# Patient Record
Sex: MALE | Race: WHITE | Employment: OTHER | URBAN - NONMETROPOLITAN AREA
[De-identification: names, ages, dates, MRNs, and addresses within clinical notes are randomized per-mention and may not be internally consistent; named-entity substitution may affect disease eponyms.]

---

## 2018-02-19 ENCOUNTER — INITIAL CONSULT (OUTPATIENT)
Dept: SURGERY | Age: 68
End: 2018-02-19
Payer: MEDICARE

## 2018-02-19 VITALS
HEIGHT: 70 IN | WEIGHT: 315 LBS | BODY MASS INDEX: 45.1 KG/M2 | SYSTOLIC BLOOD PRESSURE: 130 MMHG | TEMPERATURE: 96.7 F | HEART RATE: 100 BPM | DIASTOLIC BLOOD PRESSURE: 74 MMHG

## 2018-02-19 DIAGNOSIS — K57.30 SIGMOID DIVERTICULOSIS: ICD-10-CM

## 2018-02-19 DIAGNOSIS — E66.9 OBESITY, UNSPECIFIED CLASSIFICATION, UNSPECIFIED OBESITY TYPE, UNSPECIFIED WHETHER SERIOUS COMORBIDITY PRESENT: ICD-10-CM

## 2018-02-19 DIAGNOSIS — R19.4 ENCOUNTER FOR DIAGNOSTIC COLONOSCOPY DUE TO CHANGE IN BOWEL HABITS: ICD-10-CM

## 2018-02-19 DIAGNOSIS — K62.5 RECTAL BLEEDING: Primary | ICD-10-CM

## 2018-02-19 PROCEDURE — 1036F TOBACCO NON-USER: CPT | Performed by: SURGERY

## 2018-02-19 PROCEDURE — 99204 OFFICE O/P NEW MOD 45 MIN: CPT | Performed by: SURGERY

## 2018-02-19 PROCEDURE — 3017F COLORECTAL CA SCREEN DOC REV: CPT | Performed by: SURGERY

## 2018-02-19 PROCEDURE — 1123F ACP DISCUSS/DSCN MKR DOCD: CPT | Performed by: SURGERY

## 2018-02-19 PROCEDURE — G8417 CALC BMI ABV UP PARAM F/U: HCPCS | Performed by: SURGERY

## 2018-02-19 PROCEDURE — 4040F PNEUMOC VAC/ADMIN/RCVD: CPT | Performed by: SURGERY

## 2018-02-19 PROCEDURE — G8427 DOCREV CUR MEDS BY ELIG CLIN: HCPCS | Performed by: SURGERY

## 2018-02-19 PROCEDURE — G8484 FLU IMMUNIZE NO ADMIN: HCPCS | Performed by: SURGERY

## 2018-02-19 RX ORDER — SERTRALINE HYDROCHLORIDE 100 MG/1
100 TABLET, FILM COATED ORAL
COMMUNITY
Start: 2017-05-24

## 2018-02-19 RX ORDER — COVID-19 ANTIGEN TEST
1 KIT MISCELLANEOUS
COMMUNITY
End: 2022-10-03

## 2018-02-19 RX ORDER — ALBUTEROL SULFATE 90 UG/1
2 AEROSOL, METERED RESPIRATORY (INHALATION)
COMMUNITY
Start: 2018-02-08 | End: 2022-10-04

## 2018-02-19 NOTE — LETTER
PROCEDURE DATE:                                  Estimated surgery arrival time     You will be notified the day before surgery (usually in the afternoon) of your arrival time by the surgery center.

## 2018-03-26 ENCOUNTER — OFFICE VISIT (OUTPATIENT)
Dept: SURGERY | Age: 68
End: 2018-03-26
Payer: MEDICARE

## 2018-03-26 VITALS
SYSTOLIC BLOOD PRESSURE: 162 MMHG | DIASTOLIC BLOOD PRESSURE: 90 MMHG | BODY MASS INDEX: 45.1 KG/M2 | HEIGHT: 70 IN | HEART RATE: 88 BPM | TEMPERATURE: 96.2 F | WEIGHT: 315 LBS

## 2018-03-26 DIAGNOSIS — K51.40 PSEUDOPOLYP OF TRANSVERSE COLON WITHOUT COMPLICATION (HCC): Primary | ICD-10-CM

## 2018-03-26 DIAGNOSIS — K64.8 INTERNAL HEMORRHOIDS: ICD-10-CM

## 2018-03-26 PROCEDURE — G8417 CALC BMI ABV UP PARAM F/U: HCPCS | Performed by: SURGERY

## 2018-03-26 PROCEDURE — 3017F COLORECTAL CA SCREEN DOC REV: CPT | Performed by: SURGERY

## 2018-03-26 PROCEDURE — 1036F TOBACCO NON-USER: CPT | Performed by: SURGERY

## 2018-03-26 PROCEDURE — G8427 DOCREV CUR MEDS BY ELIG CLIN: HCPCS | Performed by: SURGERY

## 2018-03-26 PROCEDURE — 4040F PNEUMOC VAC/ADMIN/RCVD: CPT | Performed by: SURGERY

## 2018-03-26 PROCEDURE — 1123F ACP DISCUSS/DSCN MKR DOCD: CPT | Performed by: SURGERY

## 2018-03-26 PROCEDURE — 99213 OFFICE O/P EST LOW 20 MIN: CPT | Performed by: SURGERY

## 2018-03-26 PROCEDURE — G8484 FLU IMMUNIZE NO ADMIN: HCPCS | Performed by: SURGERY

## 2022-09-22 PROBLEM — U07.1 PNEUMONIA DUE TO COVID-19 VIRUS: Status: ACTIVE | Noted: 2020-11-20

## 2022-09-22 PROBLEM — U07.1 ACUTE HYPOXEMIC RESPIRATORY FAILURE DUE TO COVID-19 (HCC): Status: ACTIVE | Noted: 2020-11-19

## 2022-09-22 PROBLEM — J96.01 ACUTE HYPOXEMIC RESPIRATORY FAILURE DUE TO COVID-19 (HCC): Status: ACTIVE | Noted: 2020-11-19

## 2022-09-22 PROBLEM — U07.1 CLINICAL DIAGNOSIS OF COVID-19: Status: ACTIVE | Noted: 2022-09-22

## 2022-09-22 PROBLEM — G47.30 SLEEP APNEA: Status: ACTIVE | Noted: 2022-09-22

## 2022-09-22 PROBLEM — I26.99 ACUTE PULMONARY EMBOLISM (HCC): Status: ACTIVE | Noted: 2020-12-07

## 2022-09-22 PROBLEM — I82.439 DVT OF POPLITEAL VEIN (HCC): Status: ACTIVE | Noted: 2022-04-24

## 2022-09-22 PROBLEM — E66.01 MORBID OBESITY (HCC): Status: ACTIVE | Noted: 2022-09-22

## 2022-09-22 PROBLEM — J12.82 PNEUMONIA DUE TO COVID-19 VIRUS: Status: ACTIVE | Noted: 2020-11-20

## 2022-09-22 RX ORDER — POTASSIUM CHLORIDE 20 MEQ/1
20 TABLET, EXTENDED RELEASE ORAL DAILY
COMMUNITY
Start: 2022-04-22 | End: 2023-04-22

## 2022-09-22 RX ORDER — CARVEDILOL 3.12 MG/1
3.12 TABLET ORAL 2 TIMES DAILY WITH MEALS
COMMUNITY

## 2022-09-22 RX ORDER — FAMOTIDINE 20 MG/1
20 TABLET, FILM COATED ORAL NIGHTLY
COMMUNITY
Start: 2022-04-22 | End: 2023-04-22

## 2022-09-22 RX ORDER — FLUTICASONE PROPIONATE AND SALMETEROL 250; 50 UG/1; UG/1
POWDER RESPIRATORY (INHALATION)
COMMUNITY
End: 2022-10-03

## 2022-09-22 RX ORDER — FLUTICASONE FUROATE 100 UG/1
POWDER RESPIRATORY (INHALATION)
COMMUNITY
Start: 2022-06-15

## 2022-09-22 RX ORDER — OMEGA-3 FATTY ACIDS/FISH OIL 300-1000MG
400 CAPSULE ORAL DAILY PRN
COMMUNITY

## 2022-09-22 RX ORDER — FUROSEMIDE 20 MG/1
20 TABLET ORAL DAILY
COMMUNITY
Start: 2022-04-22 | End: 2023-04-22

## 2022-09-22 RX ORDER — CYANOCOBALAMIN 1000 UG/ML
1000 INJECTION INTRAMUSCULAR; SUBCUTANEOUS
COMMUNITY
Start: 2022-05-25 | End: 2022-10-03

## 2022-09-22 RX ORDER — ALBUTEROL SULFATE 2.5 MG/3ML
2.5 SOLUTION RESPIRATORY (INHALATION) EVERY 6 HOURS PRN
COMMUNITY

## 2022-09-22 RX ORDER — FLUTICASONE FUROATE AND VILANTEROL 100; 25 UG/1; UG/1
1 POWDER RESPIRATORY (INHALATION) DAILY
COMMUNITY
Start: 2020-12-12 | End: 2022-10-03

## 2022-09-22 RX ORDER — OMEPRAZOLE 40 MG/1
40 CAPSULE, DELAYED RELEASE ORAL DAILY
COMMUNITY
Start: 2022-09-20 | End: 2022-10-04

## 2022-09-22 RX ORDER — MECLIZINE HCL 12.5 MG/1
12.5 TABLET ORAL DAILY PRN
COMMUNITY
Start: 2022-04-22 | End: 2023-04-22

## 2022-10-03 ENCOUNTER — INITIAL CONSULT (OUTPATIENT)
Dept: SURGERY | Age: 72
End: 2022-10-03
Payer: MEDICARE

## 2022-10-03 VITALS
DIASTOLIC BLOOD PRESSURE: 76 MMHG | WEIGHT: 293.8 LBS | SYSTOLIC BLOOD PRESSURE: 132 MMHG | OXYGEN SATURATION: 95 % | HEART RATE: 72 BPM | HEIGHT: 69 IN | TEMPERATURE: 97.9 F | BODY MASS INDEX: 43.52 KG/M2

## 2022-10-03 DIAGNOSIS — R79.89 ABNORMAL LIVER FUNCTION TESTS: ICD-10-CM

## 2022-10-03 DIAGNOSIS — G47.33 OBSTRUCTIVE SLEEP APNEA SYNDROME: ICD-10-CM

## 2022-10-03 DIAGNOSIS — R79.89 ABNORMAL LIVER FUNCTION TESTS: Primary | ICD-10-CM

## 2022-10-03 DIAGNOSIS — J44.9 CHRONIC OBSTRUCTIVE PULMONARY DISEASE, UNSPECIFIED COPD TYPE (HCC): ICD-10-CM

## 2022-10-03 DIAGNOSIS — E66.01 MORBID OBESITY (HCC): ICD-10-CM

## 2022-10-03 DIAGNOSIS — K80.20 CALCULUS OF GALLBLADDER WITHOUT CHOLECYSTITIS WITHOUT OBSTRUCTION: ICD-10-CM

## 2022-10-03 PROCEDURE — G8417 CALC BMI ABV UP PARAM F/U: HCPCS | Performed by: SURGERY

## 2022-10-03 PROCEDURE — G8427 DOCREV CUR MEDS BY ELIG CLIN: HCPCS | Performed by: SURGERY

## 2022-10-03 PROCEDURE — 1036F TOBACCO NON-USER: CPT | Performed by: SURGERY

## 2022-10-03 PROCEDURE — G8484 FLU IMMUNIZE NO ADMIN: HCPCS | Performed by: SURGERY

## 2022-10-03 PROCEDURE — 99204 OFFICE O/P NEW MOD 45 MIN: CPT | Performed by: SURGERY

## 2022-10-03 PROCEDURE — 3023F SPIROM DOC REV: CPT | Performed by: SURGERY

## 2022-10-03 PROCEDURE — 1123F ACP DISCUSS/DSCN MKR DOCD: CPT | Performed by: SURGERY

## 2022-10-03 PROCEDURE — 3017F COLORECTAL CA SCREEN DOC REV: CPT | Performed by: SURGERY

## 2022-10-03 ASSESSMENT — ENCOUNTER SYMPTOMS
VOICE CHANGE: 0
VOMITING: 0
SHORTNESS OF BREATH: 1
NAUSEA: 0
WHEEZING: 1
SORE THROAT: 0
COUGH: 0
DIARRHEA: 0
CONSTIPATION: 0
TROUBLE SWALLOWING: 0
BACK PAIN: 1
ABDOMINAL PAIN: 1
FLATUS: 1
CHEST TIGHTNESS: 0
BELCHING: 1
ABDOMINAL DISTENTION: 0

## 2022-10-03 ASSESSMENT — CROHNS DISEASE ACTIVITY INDEX (CDAI): CDAI SCORE: 0

## 2022-10-03 NOTE — PROGRESS NOTES
Abdominal Pain  This is a new problem. The current episode started 1 to 4 weeks ago (About 3 weeks). The problem occurs every several days (About 4 - 5 times). The most recent episode lasted 4 hours. The pain is located in the RUQ and epigastric region. The quality of the pain is a sensation of fullness and aching. The abdominal pain does not radiate. Associated symptoms include arthralgias, belching, flatus and headaches. Pertinent negatives include no anorexia, constipation, diarrhea, dysuria, fever, frequency, hematuria, myalgias, nausea or vomiting. Nothing aggravates the pain. The pain is relieved by Nothing. He has tried H2 blockers, proton pump inhibitors and antacids for the symptoms. The treatment provided significant relief. Prior diagnostic workup includes ultrasound. His past medical history is significant for abdominal surgery (appendectomy) and gallstones. There is no history of colon cancer, Crohn's disease, GERD, irritable bowel syndrome, pancreatitis, PUD or ulcerative colitis.      Past Medical History:   Diagnosis Date    Asthma     Depression     Emphysema of lung ([address]     Sleep apnea      Past Surgical History:   Procedure Laterality Date    ABDOMINAL WALL SURGERY Right 2000    RLQ lipoma removed    APPENDECTOMY N/A 1957    CARPAL TUNNEL RELEASE Right     COLONOSCOPY  03/19/2018    polyp-[id number]    EXTRACORPOREAL SHOCK WAVE LITHOTRIPSY      KNEE ARTHROSCOPY Right     ROTATOR CUFF REPAIR Right     SHOULDER SURGERY Right     TONSILLECTOMY       Current Outpatient Medications   Medication Sig Dispense Refill    albuterol (PROVENTIL) (2.5 MG/3ML) 0.083% nebulizer solution Take 2.5 mg by nebulization every 6 hours as needed for Wheezing      carvedilol (COREG) 3.125 MG tablet Take 3.125 mg by mouth 2 times daily (with meals)      Cyanocobalamin 1000 MCG CAPS Take 1,000 mcg by mouth daily      famotidine (PEPCID) 20 MG tablet Take 20 mg by mouth at bedtime      furosemide (LASIX) 20 MG vomiting. Endocrine: Positive for polydipsia and polyuria. Negative for polyphagia. Genitourinary:  Negative for dysuria, frequency and hematuria. Musculoskeletal:  Positive for arthralgias, back pain and neck pain. Negative for myalgias. Skin:  Positive for rash (left leg). Negative for wound. Neurological:  Positive for dizziness (vertigo for a long time), weakness, numbness (left lateral thigh) and headaches. Negative for seizures, syncope, speech difficulty and light-headedness. Hematological:  Bruises/bleeds easily. Psychiatric/Behavioral:  Positive for sleep disturbance (excessive sleepiness). Negative for behavioral problems, confusion, decreased concentration and dysphoric mood. The patient is not hyperactive. /76 (Site: Left Upper Arm, Position: Sitting, Cuff Size: Large Adult)   Pulse 72   Temp 97.9 °F (36.6 °C) (Temporal)   Ht 5' 9\" (1.753 m)   Wt 293 lb 12.8 oz (133.3 kg)   SpO2 95%   BMI 43.39 kg/m²     Physical Exam  Constitutional:       General: He is not in acute distress. Appearance: He is morbidly obese. He is ill-appearing. Comments: Chronically ill and frail appearing   HENT:      Head: Normocephalic and atraumatic. Eyes:      General: No scleral icterus. Right eye: No discharge. Left eye: No discharge. Extraocular Movements: Extraocular movements intact. Conjunctiva/sclera: Conjunctivae normal.      Pupils: Pupils are equal, round, and reactive to light. Neck:      Vascular: No carotid bruit. Cardiovascular:      Rate and Rhythm: Normal rate and regular rhythm. Pulmonary:      Effort: Respiratory distress present. Breath sounds: Decreased air movement present. Decreased breath sounds present. Abdominal:      General: Abdomen is protuberant. There is no distension or abdominal bruit. Palpations: Abdomen is soft. There is no hepatomegaly or splenomegaly. Tenderness: There is no abdominal tenderness.  There is no guarding or rebound. Negative signs include Yañez's sign. Hernia: No hernia is present. There is no hernia in the umbilical area, ventral area, left inguinal area or right inguinal area. Musculoskeletal:      Cervical back: Normal range of motion and neck supple. Tenderness present. No rigidity. Comments: Walks with cane   Lymphadenopathy:      Cervical: No cervical adenopathy. Skin:     General: Skin is warm and dry. Neurological:      General: No focal deficit present. Mental Status: He is alert and oriented to person, place, and time. Psychiatric:         Mood and Affect: Mood normal.         Behavior: Behavior normal. Behavior is cooperative. Thought Content: Thought content normal.         Judgment: Judgment normal.      Ultrasound shows gallstones    Abnormal LFTs    IMP/PLN  1) Cholelithiasis - possible biliary colic, chronic cholecystitis  - He has fairly typical symptoms.  - Responded to antacids which points to a potential acid problem such as GERD, ulcers gastritis. - I think he should consider having his gall bladder out, but his risk of surgery is considerably elevated. - The is an increased chance he might need prolonged ventilation   - I would recommend he do this in [place] or [facility] where he can be placed on the ventilator if needed. - He has not had symptoms in over a week.   I recommend continuing of a low fat diet and have a pulmonology evaluation  2) COPD, chronic respiratory distress - Pulmonology referral.   3) Repeat LFTs  4) Morbid Obesity

## 2022-10-04 ENCOUNTER — OFFICE VISIT (OUTPATIENT)
Dept: OTOLARYNGOLOGY | Age: 72
End: 2022-10-04
Payer: MEDICARE

## 2022-10-04 ENCOUNTER — HOSPITAL ENCOUNTER (OUTPATIENT)
Dept: LAB | Age: 72
Discharge: HOME | End: 2022-10-04
Payer: MEDICARE

## 2022-10-04 VITALS
HEIGHT: 69 IN | SYSTOLIC BLOOD PRESSURE: 136 MMHG | WEIGHT: 291 LBS | OXYGEN SATURATION: 98 % | DIASTOLIC BLOOD PRESSURE: 78 MMHG | RESPIRATION RATE: 16 BRPM | BODY MASS INDEX: 43.1 KG/M2 | HEART RATE: 72 BPM

## 2022-10-04 DIAGNOSIS — H91.20 SUDDEN-ONSET SENSORINEURAL HEARING LOSS: Primary | ICD-10-CM

## 2022-10-04 DIAGNOSIS — H91.20 SUDDEN-ONSET SENSORINEURAL HEARING LOSS: ICD-10-CM

## 2022-10-04 LAB
CREAT SERPL-MCNC: 0.85 MG/DL (ref 0.7–1.2)
GFR SERPL CREATININE-BSD FRML MDRD: >60 ML/MIN/1.73M2

## 2022-10-04 PROCEDURE — 82565 ASSAY OF CREATININE: CPT

## 2022-10-04 PROCEDURE — 99215 OFFICE O/P EST HI 40 MIN: CPT | Performed by: OTOLARYNGOLOGY

## 2022-10-04 PROCEDURE — 3017F COLORECTAL CA SCREEN DOC REV: CPT | Performed by: OTOLARYNGOLOGY

## 2022-10-04 PROCEDURE — G8417 CALC BMI ABV UP PARAM F/U: HCPCS | Performed by: OTOLARYNGOLOGY

## 2022-10-04 PROCEDURE — 1123F ACP DISCUSS/DSCN MKR DOCD: CPT | Performed by: OTOLARYNGOLOGY

## 2022-10-04 PROCEDURE — 1036F TOBACCO NON-USER: CPT | Performed by: OTOLARYNGOLOGY

## 2022-10-04 PROCEDURE — G8427 DOCREV CUR MEDS BY ELIG CLIN: HCPCS | Performed by: OTOLARYNGOLOGY

## 2022-10-04 PROCEDURE — G8484 FLU IMMUNIZE NO ADMIN: HCPCS | Performed by: OTOLARYNGOLOGY

## 2022-10-04 PROCEDURE — 99203 OFFICE O/P NEW LOW 30 MIN: CPT | Performed by: OTOLARYNGOLOGY

## 2022-10-04 PROCEDURE — 36415 COLL VENOUS BLD VENIPUNCTURE: CPT

## 2022-10-04 RX ORDER — CHOLECALCIFEROL (VITAMIN D3) 1250 MCG
CAPSULE ORAL DAILY
COMMUNITY

## 2022-10-04 RX ORDER — PREDNISONE 20 MG/1
TABLET ORAL
Qty: 28 TABLET | Refills: 0 | Status: SHIPPED | OUTPATIENT
Start: 2022-10-04

## 2022-10-04 RX ORDER — MULTIVIT WITH MINERALS/LUTEIN
250 TABLET ORAL DAILY
COMMUNITY

## 2022-10-04 NOTE — PROGRESS NOTES
10/4/2022 1:34 PM EDT  Kendra Bhakta (:  1950) is a 74 y.o. male,New patient, here for evaluation of the following chief complaint(s):  Hearing Problem (About 2 weeks on left side as not been able to hear much of anything. Says it sounds like a \"swooshing\" sound all the time. )      ASSESSMENT/PLAN:  1. Sudden-onset sensorineural hearing loss      1. Sudden-onset sensorineural hearing loss  Comments:  left  Orders:  -     MRI IAC POSTERIOR FOSSA W WO CONTRAST; Future  -     Creatinine; Future  Left sudden sensorineural hearing loss  High-dose prednisone  MRI IAC to evaluate for retrocochlear pathology  Repeat audiogram in 4 to 6 weeks  Follow-up to review audiogram    No follow-ups on file. SUBJECTIVE/OBJECTIVE:  HPI  States that 2 to 3 weeks ago, he was mowing the grass and then noticed decreased hearing on the left side. He was in to see the audiologist today who noted a significant change in his left hearing thresholds since his audiogram in . The reason for the audiogram was ear pain and bilateral tinnitus. Patient denies any recent changes in tinnitus, vertigo, otalgia. He has a history of vertigo or 15 years ago that was treated as BPPV in [facility]. No preceding upper respiratory infections. He did have COVID in 2020 when he was in the hospital and intubated for about 45 days and then in a rehab facility for another 45 days. He has some chronic conditions attributed to COVID.     Audiogram 2007  Right hearing hearing within normal limits through 1000 Hz with mild to moderate high-frequency sensorineural hearing loss  Left hearing within normal limits through 2000 Hz with moderate to severe high-frequency sensorineural hearing loss  Asymmetric bone lines at 3000, 6000, 8000 Hz  Word recognition score 100% on the right, 96% on the left  Type A tympanometry AU    Audiogram 10/4/2022  Right hearing borderline through 2000 Hz with moderate high-frequency sensorineural hearing loss  Left hearing moderate to severe sensorineural hearing loss  Greater than 30 DB asymmetry in 3 consecutive frequencies  Word recognition score 100% on the right, 54% on the left  Type A tympanometry AU    Past Medical History:   Diagnosis Date    Asthma     Depression     Emphysema of lung ([address]     Hypertension     Sleep apnea      Past Surgical History:   Procedure Laterality Date    ABDOMINAL WALL SURGERY Right 2000    RLQ lipoma removed    APPENDECTOMY N/A 1957    CARPAL TUNNEL RELEASE Right     COLONOSCOPY  03/19/2018    polyp-hemorrhoid-aljadda-pch    EXTRACORPOREAL SHOCK WAVE LITHOTRIPSY      KNEE ARTHROSCOPY Right     ROTATOR CUFF REPAIR Right     SHOULDER SURGERY Right     TONSILLECTOMY       Social History       Tobacco History       Smoking Status  Former Quit Date  2/19/1985 Smoking Tobacco Type  Cigarettes quit in 2/19/1985      Smokeless Tobacco Use  Never              Alcohol History       Alcohol Use Status  Yes Comment  beer occasioanly 1-2 per month              Drug Use       Drug Use Status  No              Sexual Activity       Sexually Active  Not Asked                  History reviewed. No pertinent family history.   Current Outpatient Medications   Medication Instructions    albuterol (PROVENTIL) 2.5 mg, Nebulization, EVERY 6 HOURS PRN    albuterol sulfate  (90 Base) MCG/ACT inhaler 2 puffs, Inhalation    aspirin 81 mg, Oral    B Complex Vitamins (B-COMPLEX/B-12 PO) Oral, DAILY    carvedilol (COREG) 3.125 mg, Oral, 2 TIMES DAILY WITH MEALS    Cholecalciferol (VITAMIN D3) 1.25 MG (23559 UT) CAPS Oral, DAILY    Cyanocobalamin 1,000 mcg, Oral, DAILY    famotidine (PEPCID) 20 mg, Oral, Nightly    fluticasone (ARNUITY ELLIPTA) 100 MCG/ACT AEPB inhale 1 puff by inhalation route  every day at the same time each day    fluticasone-salmeterol (ADVAIR) 100-50 MCG/DOSE diskus inhaler 1 puff, Inhalation    furosemide (LASIX) 20 mg, DAILY    ibuprofen (ADVIL;MOTRIN) 400 mg, Oral, DAILY PRN meclizine (ANTIVERT) 12.5 mg, Oral, DAILY PRN    Nebulizer MISC 1 ampule    omeprazole (PRILOSEC) 40 mg, Oral, DAILY    OXYGEN Other, NIGHTLY PRN    potassium chloride (KLOR-CON M) 20 MEQ extended release tablet 20 mEq, Oral, DAILY    predniSONE (DELTASONE) 20 MG tablet Take 3 tabs (60mg) once a day for 7 days. Then take 2 tabs (40mg) once a day for 2 days, 1 tab (20mg) once a day for 2 days, then 1/2 tab (10mg) once a day for 2 days    sertraline (ZOLOFT) 100 mg, Oral    vitamin C 250 mg, Oral, DAILY     Allergies   Allergen Reactions    Moxifloxacin Shortness Of Breath       ENT ROS: positive for - hearing change    General: The patient is found to be alert and normally responsive male. Hearing: grossly normal   Voice: Clear   Skin: The skin has normal colour and turgor. Face: The facial contour is symmetric at rest and with movement. Ears: The pinnae have normal contours. AD: EAC clear, TM intact, no effusion/erythema/retraction   AS: EAC clear, TM intact, no effusion/erythema/retraction   Eye: The ocular movements are full and symmetric, the conjunctiva is unremarkable; sclera are anicteric, pupillary response is symmetric. No nystagmus is found. Nose:   The external nasal contour is normal  The nasal mucosa has a normal appearance. The nasal septum is straight. The turbinates have a normal appearance  The nares are patent without evidence of polyposis   Oral cavity:   The dentition is healthy. The oral mucosa is without lesions;  the tongue is symmetric with full mobility and is without fasciculation. The soft palate is symmetric. The oropharynx is unremarkable. Neck: The neck has a normal contour; no masses are found on palpation        An electronic signature was used to authenticate this note.     --Omar Nash MD     10/4/2022 1:34 PM EDT

## 2022-10-07 ENCOUNTER — TELEPHONE (OUTPATIENT)
Dept: SURGERY | Age: 72
End: 2022-10-07

## 2022-10-07 DIAGNOSIS — J44.9 CHRONIC OBSTRUCTIVE PULMONARY DISEASE, UNSPECIFIED COPD TYPE (HCC): Primary | ICD-10-CM

## 2022-10-07 NOTE — TELEPHONE ENCOUNTER
Patients initial consult was this past Monday 10/3/22 @ [address]. A referral to pulmonology was discussed to see if patient was fit for surgery. Dr. Chris Aguilar and patient wanted to discuss this with PCP office for any recommendations. Writer contacted Dr. Lubna Bailon office and his nurse stated to go ahead and place referral for pulmonologist here at [facility]. Pulmonology referral placed, reached out to schedule appointment and nurse answering states someone would call patient to schedule on the following Monday. LM for patient to return call to office.

## 2022-10-11 ENCOUNTER — HOSPITAL ENCOUNTER (OUTPATIENT)
Dept: MRI IMAGING | Age: 72
Discharge: HOME | End: 2022-10-13
Payer: MEDICARE

## 2022-10-11 DIAGNOSIS — H91.20 SUDDEN-ONSET SENSORINEURAL HEARING LOSS: ICD-10-CM

## 2022-10-11 PROCEDURE — 6360000004 HC RX CONTRAST MEDICATION: Performed by: OTOLARYNGOLOGY

## 2022-10-11 PROCEDURE — A9579 GAD-BASE MR CONTRAST NOS,1ML: HCPCS | Performed by: OTOLARYNGOLOGY

## 2022-10-11 PROCEDURE — 70553 MRI BRAIN STEM W/O & W/DYE: CPT

## 2022-10-11 RX ADMIN — GADOTERIDOL 15 ML: 279.3 INJECTION, SOLUTION INTRAVENOUS at 14:25

## 2022-10-28 ENCOUNTER — APPOINTMENT (OUTPATIENT)
Dept: CT IMAGING | Age: 72
End: 2022-10-28
Payer: MEDICARE

## 2022-10-28 ENCOUNTER — HOSPITAL ENCOUNTER (EMERGENCY)
Age: 72
Discharge: ANOTHER HOSPITAL | End: 2022-10-28
Attending: EMERGENCY MEDICINE
Payer: MEDICARE

## 2022-10-28 VITALS
OXYGEN SATURATION: 96 % | SYSTOLIC BLOOD PRESSURE: 105 MMHG | DIASTOLIC BLOOD PRESSURE: 66 MMHG | HEIGHT: 69 IN | BODY MASS INDEX: 42.95 KG/M2 | HEART RATE: 92 BPM | TEMPERATURE: 100.5 F | WEIGHT: 290 LBS | RESPIRATION RATE: 20 BRPM

## 2022-10-28 DIAGNOSIS — K81.9 CHOLECYSTITIS: Primary | ICD-10-CM

## 2022-10-28 LAB
ABSOLUTE EOS #: 0.09 K/UL (ref 0–0.44)
ABSOLUTE IMMATURE GRANULOCYTE: 0.11 K/UL (ref 0–0.3)
ABSOLUTE LYMPH #: 0.67 K/UL (ref 1.1–3.7)
ABSOLUTE MONO #: 1.45 K/UL (ref 0.1–1.2)
ALBUMIN SERPL-MCNC: 3.9 G/DL (ref 3.5–5.2)
ALBUMIN/GLOBULIN RATIO: 1.4 (ref 1–2.5)
ALP BLD-CCNC: 84 U/L (ref 40–129)
ALT SERPL-CCNC: 34 U/L (ref 5–41)
AMYLASE: 24 U/L (ref 28–100)
ANION GAP SERPL CALCULATED.3IONS-SCNC: 13 MMOL/L (ref 9–17)
AST SERPL-CCNC: 29 U/L
BASOPHILS # BLD: 0 % (ref 0–2)
BASOPHILS ABSOLUTE: 0.06 K/UL (ref 0–0.2)
BILIRUB SERPL-MCNC: 2.9 MG/DL (ref 0.3–1.2)
BILIRUBIN DIRECT: 1 MG/DL
BILIRUBIN, INDIRECT: 1.9 MG/DL (ref 0–1)
BUN BLDV-MCNC: 15 MG/DL (ref 8–23)
CALCIUM SERPL-MCNC: 9 MG/DL (ref 8.6–10.4)
CHLORIDE BLD-SCNC: 101 MMOL/L (ref 98–107)
CO2: 22 MMOL/L (ref 20–31)
CREAT SERPL-MCNC: 0.87 MG/DL (ref 0.7–1.2)
EOSINOPHILS RELATIVE PERCENT: 0 % (ref 1–4)
GFR SERPL CREATININE-BSD FRML MDRD: >60 ML/MIN/1.73M2
GLUCOSE BLD-MCNC: 121 MG/DL (ref 70–99)
HCT VFR BLD CALC: 43.6 % (ref 40.7–50.3)
HEMOGLOBIN: 14.8 G/DL (ref 13–17)
IMMATURE GRANULOCYTES: 1 %
LACTIC ACID, SEPSIS: 0.9 MMOL/L (ref 0.5–1.9)
LACTIC ACID: 1.8 MMOL/L (ref 0.5–2.2)
LIPASE: 12 U/L (ref 13–60)
LYMPHOCYTES # BLD: 3 % (ref 24–43)
MCH RBC QN AUTO: 29.7 PG (ref 25.2–33.5)
MCHC RBC AUTO-ENTMCNC: 33.9 G/DL (ref 25.2–33.5)
MCV RBC AUTO: 87.6 FL (ref 82.6–102.9)
MONOCYTES # BLD: 7 % (ref 3–12)
NRBC AUTOMATED: 0 PER 100 WBC
PDW BLD-RTO: 13.5 % (ref 11.8–14.4)
PLATELET # BLD: 185 K/UL (ref 138–453)
PMV BLD AUTO: 11.6 FL (ref 8.1–13.5)
POTASSIUM SERPL-SCNC: 3.7 MMOL/L (ref 3.7–5.3)
RBC # BLD: 4.98 M/UL (ref 4.21–5.77)
SARS-COV-2, RAPID: NOT DETECTED
SEG NEUTROPHILS: 89 % (ref 36–65)
SEGMENTED NEUTROPHILS ABSOLUTE COUNT: 18.19 K/UL (ref 1.5–8.1)
SODIUM BLD-SCNC: 136 MMOL/L (ref 135–144)
SPECIMEN DESCRIPTION: NORMAL
TOTAL PROTEIN: 6.6 G/DL (ref 6.4–8.3)
TROPONIN, HIGH SENSITIVITY: 16 NG/L (ref 0–22)
WBC # BLD: 20.6 K/UL (ref 3.5–11.3)

## 2022-10-28 PROCEDURE — 87040 BLOOD CULTURE FOR BACTERIA: CPT

## 2022-10-28 PROCEDURE — 96376 TX/PRO/DX INJ SAME DRUG ADON: CPT

## 2022-10-28 PROCEDURE — 84484 ASSAY OF TROPONIN QUANT: CPT

## 2022-10-28 PROCEDURE — 96365 THER/PROPH/DIAG IV INF INIT: CPT

## 2022-10-28 PROCEDURE — 6360000004 HC RX CONTRAST MEDICATION: Performed by: EMERGENCY MEDICINE

## 2022-10-28 PROCEDURE — 36415 COLL VENOUS BLD VENIPUNCTURE: CPT

## 2022-10-28 PROCEDURE — 87635 SARS-COV-2 COVID-19 AMP PRB: CPT

## 2022-10-28 PROCEDURE — 80053 COMPREHEN METABOLIC PANEL: CPT

## 2022-10-28 PROCEDURE — 83605 ASSAY OF LACTIC ACID: CPT

## 2022-10-28 PROCEDURE — 83690 ASSAY OF LIPASE: CPT

## 2022-10-28 PROCEDURE — 2580000003 HC RX 258: Performed by: EMERGENCY MEDICINE

## 2022-10-28 PROCEDURE — 82150 ASSAY OF AMYLASE: CPT

## 2022-10-28 PROCEDURE — 6360000002 HC RX W HCPCS: Performed by: EMERGENCY MEDICINE

## 2022-10-28 PROCEDURE — 85025 COMPLETE CBC W/AUTO DIFF WBC: CPT

## 2022-10-28 PROCEDURE — 93005 ELECTROCARDIOGRAM TRACING: CPT | Performed by: EMERGENCY MEDICINE

## 2022-10-28 PROCEDURE — 99285 EMERGENCY DEPT VISIT HI MDM: CPT

## 2022-10-28 PROCEDURE — 82248 BILIRUBIN DIRECT: CPT

## 2022-10-28 PROCEDURE — 6370000000 HC RX 637 (ALT 250 FOR IP): Performed by: EMERGENCY MEDICINE

## 2022-10-28 PROCEDURE — 2709999900 CT ABDOMEN PELVIS W IV CONTRAST

## 2022-10-28 PROCEDURE — 96375 TX/PRO/DX INJ NEW DRUG ADDON: CPT

## 2022-10-28 RX ORDER — ONDANSETRON 2 MG/ML
4 INJECTION INTRAMUSCULAR; INTRAVENOUS ONCE
Status: COMPLETED | OUTPATIENT
Start: 2022-10-28 | End: 2022-10-28

## 2022-10-28 RX ORDER — FENTANYL CITRATE 50 UG/ML
50 INJECTION, SOLUTION INTRAMUSCULAR; INTRAVENOUS ONCE
Status: COMPLETED | OUTPATIENT
Start: 2022-10-28 | End: 2022-10-28

## 2022-10-28 RX ORDER — ACETAMINOPHEN 500 MG
1000 TABLET ORAL ONCE
Status: COMPLETED | OUTPATIENT
Start: 2022-10-28 | End: 2022-10-28

## 2022-10-28 RX ADMIN — ACETAMINOPHEN 1000 MG: 500 TABLET ORAL at 13:37

## 2022-10-28 RX ADMIN — IOPAMIDOL 100 ML: 755 INJECTION, SOLUTION INTRAVENOUS at 11:03

## 2022-10-28 RX ADMIN — FENTANYL CITRATE 50 MCG: 50 INJECTION, SOLUTION INTRAMUSCULAR; INTRAVENOUS at 10:17

## 2022-10-28 RX ADMIN — PIPERACILLIN AND TAZOBACTAM 4500 MG: 4; .5 INJECTION, POWDER, LYOPHILIZED, FOR SOLUTION INTRAVENOUS at 12:08

## 2022-10-28 RX ADMIN — HYDROMORPHONE HYDROCHLORIDE 0.5 MG: 1 INJECTION, SOLUTION INTRAMUSCULAR; INTRAVENOUS; SUBCUTANEOUS at 12:36

## 2022-10-28 RX ADMIN — FENTANYL CITRATE 50 MCG: 50 INJECTION, SOLUTION INTRAMUSCULAR; INTRAVENOUS at 12:10

## 2022-10-28 RX ADMIN — ONDANSETRON 4 MG: 2 INJECTION INTRAMUSCULAR; INTRAVENOUS at 10:17

## 2022-10-28 RX ADMIN — HYDROMORPHONE HYDROCHLORIDE 0.5 MG: 1 INJECTION, SOLUTION INTRAMUSCULAR; INTRAVENOUS; SUBCUTANEOUS at 18:31

## 2022-10-28 ASSESSMENT — PAIN DESCRIPTION - ORIENTATION
ORIENTATION: RIGHT;UPPER

## 2022-10-28 ASSESSMENT — PAIN DESCRIPTION - LOCATION
LOCATION: ABDOMEN

## 2022-10-28 ASSESSMENT — PAIN SCALES - GENERAL
PAINLEVEL_OUTOF10: 5
PAINLEVEL_OUTOF10: 6
PAINLEVEL_OUTOF10: 0
PAINLEVEL_OUTOF10: 5
PAINLEVEL_OUTOF10: 8
PAINLEVEL_OUTOF10: 3
PAINLEVEL_OUTOF10: 5
PAINLEVEL_OUTOF10: 9
PAINLEVEL_OUTOF10: 10
PAINLEVEL_OUTOF10: 4
PAINLEVEL_OUTOF10: 3

## 2022-10-28 ASSESSMENT — ENCOUNTER SYMPTOMS
BLOOD IN STOOL: 0
WHEEZING: 0
CONSTIPATION: 0
ABDOMINAL PAIN: 1
BACK PAIN: 0
VOMITING: 0
NAUSEA: 1
TROUBLE SWALLOWING: 0
DIARRHEA: 0
SHORTNESS OF BREATH: 0
SORE THROAT: 0

## 2022-10-28 ASSESSMENT — PAIN DESCRIPTION - FREQUENCY
FREQUENCY: CONTINUOUS
FREQUENCY: INTERMITTENT

## 2022-10-28 ASSESSMENT — PAIN DESCRIPTION - PAIN TYPE
TYPE: ACUTE PAIN
TYPE: ACUTE PAIN

## 2022-10-28 ASSESSMENT — PAIN - FUNCTIONAL ASSESSMENT
PAIN_FUNCTIONAL_ASSESSMENT: 0-10
PAIN_FUNCTIONAL_ASSESSMENT: NONE - DENIES PAIN

## 2022-10-28 ASSESSMENT — PAIN DESCRIPTION - ONSET
ONSET: PROGRESSIVE
ONSET: PROGRESSIVE

## 2022-10-28 ASSESSMENT — PAIN DESCRIPTION - DESCRIPTORS
DESCRIPTORS: SHARP
DESCRIPTORS: SHARP

## 2022-10-28 NOTE — ED PROVIDER NOTES
[facility]  eMERGENCY dEPARTMENT eNCOUnter      Pt Name: Teri Castillo  MRN: [id number]  [place] 1950  Date of evaluation: 10/28/2022      CHIEF COMPLAINT       Chief Complaint   Patient presents with    Abdominal Pain     RUQ         HISTORY OF PRESENT ILLNESS    Teri Castillo is a 74 y.o. male who presents complaint of abdominal pain patient is its been going on for couple of weeks but much worse since Wednesday after eating some beef broth soup his right upper quadrant but goes to the umbilicus is nauseated with today he has had sweats with it nothing seems to make it better or worse currently he has a history of known gallstones and has seen general surgery regarding this. Pain is sharp and stabbing he rates it a 9 out of 10    REVIEW OF SYSTEMS         Review of Systems   Constitutional:  Positive for diaphoresis and fever. Negative for chills. HENT:  Negative for congestion, dental problem, sore throat and trouble swallowing. Eyes:  Negative for visual disturbance. Respiratory:  Negative for shortness of breath and wheezing. Cardiovascular:  Negative for chest pain, palpitations and leg swelling. Gastrointestinal:  Positive for abdominal pain and nausea. Negative for blood in stool, constipation, diarrhea and vomiting. Genitourinary:  Positive for difficulty urinating. Negative for dysuria and testicular pain. Patient says he has difficulty urinating that is usual for him there is been no dysuria   Musculoskeletal:  Negative for back pain, joint swelling and neck pain. Skin:  Negative for rash. Neurological:  Negative for dizziness, syncope, weakness and headaches. Hematological:  Negative for adenopathy. Does not bruise/bleed easily. Psychiatric/Behavioral:  Negative for confusion and suicidal ideas. PAST MEDICAL HISTORY    has a past medical history of Asthma, COVID, Depression, Emphysema of lung ([address] Hypertension, and Sleep apnea.     SURGICAL HISTORY      has a past surgical history that includes Appendectomy (N/A, 1957); Tonsillectomy; Knee arthroscopy (Right); Rotator cuff repair (Right); Carpal tunnel release (Right); Colonoscopy (03/19/2018); Abdominal wall surgery (Right, 2000); Extracorporeal shock wave lithotripsy; and shoulder surgery (Right). CURRENT MEDICATIONS       Previous Medications    ALBUTEROL (PROVENTIL) (2.5 MG/3ML) 0.083% NEBULIZER SOLUTION    Take 2.5 mg by nebulization every 6 hours as needed for Wheezing    ALBUTEROL SULFATE  (90 BASE) MCG/ACT INHALER    Inhale 2 puffs into the lungs    ASCORBIC ACID (VITAMIN C) 250 MG TABLET    Take 250 mg by mouth daily    ASPIRIN 81 MG TABLET    Take 81 mg by mouth    B COMPLEX VITAMINS (B-COMPLEX/B-12 PO)    Take by mouth daily    CARVEDILOL (COREG) 3.125 MG TABLET    Take 3.125 mg by mouth 2 times daily (with meals)    CHOLECALCIFEROL (VITAMIN D3) 1.25 MG (91592 UT) CAPS    Take by mouth daily    CYANOCOBALAMIN 1000 MCG CAPS    Take 1,000 mcg by mouth daily    FAMOTIDINE (PEPCID) 20 MG TABLET    Take 20 mg by mouth at bedtime    FLUTICASONE (ARNUITY ELLIPTA) 100 MCG/ACT AEPB    inhale 1 puff by inhalation route  every day at the same time each day    FLUTICASONE-SALMETEROL (ADVAIR) 100-50 MCG/DOSE DISKUS INHALER    Inhale 1 puff into the lungs    FUROSEMIDE (LASIX) 20 MG TABLET    Take 20 mg by mouth daily    IBUPROFEN (ADVIL;MOTRIN) 200 MG CAPS    Take 400 mg by mouth daily as needed    MECLIZINE (ANTIVERT) 12.5 MG TABLET    Take 12.5 mg by mouth daily as needed    NEBULIZER MISC    1 ampule    OMEPRAZOLE (PRILOSEC) 40 MG DELAYED RELEASE CAPSULE    Take 40 mg by mouth daily    OXYGEN    by Other route nightly as needed    POTASSIUM CHLORIDE (KLOR-CON M) 20 MEQ EXTENDED RELEASE TABLET    Take 20 mEq by mouth daily    PREDNISONE (DELTASONE) 20 MG TABLET    Take 3 tabs (60mg) once a day for 7 days.  Then take 2 tabs (40mg) once a day for 2 days, 1 tab (20mg) once a day for 2 days, then 1/2 tab (10mg) once a day for 2 days    SERTRALINE (ZOLOFT) 100 MG TABLET    Take 100 mg by mouth       ALLERGIES     is allergic to moxifloxacin. FAMILY HISTORY     has no family status information on file. family history is not on file. SOCIAL HISTORY      reports that he quit smoking about 37 years ago. His smoking use included cigarettes. He has never used smokeless tobacco. He reports current alcohol use. He reports that he does not use drugs. PHYSICAL EXAM     INITIAL VITALS:  height is 5' 9\" (1.753 m) and weight is 290 lb (131.5 kg). His tympanic temperature is 100.4 °F (38 °C). His blood pressure is 107/66 and his pulse is 103 (abnormal). His respiration is 18 and oxygen saturation is 95%. Physical Exam  Constitutional:       Appearance: He is well-developed. He is obese. He is ill-appearing and diaphoretic. HENT:      Head: Normocephalic and atraumatic. Right Ear: External ear normal.      Left Ear: External ear normal.   Eyes:      Pupils: Pupils are equal, round, and reactive to light. Cardiovascular:      Rate and Rhythm: Normal rate and regular rhythm. Pulmonary:      Effort: Pulmonary effort is normal.      Breath sounds: Normal breath sounds. Abdominal:      General: Bowel sounds are normal.      Palpations: Abdomen is soft. Tenderness: There is generalized abdominal tenderness and tenderness in the right upper quadrant. Musculoskeletal:         General: Normal range of motion. Cervical back: Normal range of motion and neck supple. Skin:     General: Skin is warm. Neurological:      Mental Status: He is alert and oriented to person, place, and time.    Psychiatric:         Behavior: Behavior normal.         DIFFERENTIAL DIAGNOSIS/ MDM:     Nominal pain fever rule out cholecystitis abdominal    DIAGNOSTIC RESULTS     EKG: All EKG's are interpreted by the Emergency Department Physician who either signs or Co-signs this chart in the absence of a cardiologist.  Sinus tachycardia rate of 101 bpm KS interval is 132 ms QRS duration 92 ms QT corrected 406 ms axis is -25 there is no acute ST or T wave changes seen      RADIOLOGY:   I directly visualized the following  images and reviewed the radiologist interpretations:       EXAMINATION:   CT OF THE ABDOMEN AND PELVIS WITH CONTRAST 10/28/2022 11:04 am       TECHNIQUE:   CT of the abdomen and pelvis was performed with the administration of   intravenous contrast. Multiplanar reformatted images are provided for review. Automated exposure control, iterative reconstruction, and/or weight based   adjustment of the mA/kV was utilized to reduce the radiation dose to as low   as reasonably achievable. COMPARISON:   None. HISTORY:   ORDERING SYSTEM PROVIDED HISTORY: Right upper quadrant pain worsening   TECHNOLOGIST PROVIDED HISTORY:       Right upper quadrant pain worsening   Decision Support Exception - unselect if not a suspected or confirmed   emergency medical condition->Emergency Medical Condition (MA)   Reason for Exam: Right upper quadrant pain       FINDINGS:   Lower Chest: Trace pericardial effusion. Bibasilar interstitial fibrosis. Organs: Calcified hepatic and splenic parenchymal granulomas. Evidence of   acute cholecystitis with marked gallbladder wall thickening and extensive   surrounding edema/induration. No calcified gallstone visualized. No   definite evidence of acute pancreatitis. Otherwise unremarkable. GI/Bowel: Colonic diverticulosis without diverticulitis. No obstruction or   abnormal wall thickening. Pelvis: Mild prostatomegaly. Peritoneum/Retroperitoneum: Mild atherosclerotic calcification of the   abdominal aorta and major branch vessels       Bones/Soft Tissues: Within normal limits. Impression   1. Suspected acute cholecystitis. No calcified gallstones visualized. Sonographic correlation may be helpful as clinically warranted.        2. Trace pericardial effusion. 3.  Colonic diverticulosis. 4.  Additional nonemergent findings, as above. ED BEDSIDE ULTRASOUND:       LABS:  Labs Reviewed   CBC WITH AUTO DIFFERENTIAL - Abnormal; Notable for the following components:       Result Value    WBC 20.6 (*)     MCHC 33.9 (*)     Seg Neutrophils 89 (*)     Lymphocytes 3 (*)     Eosinophils % 0 (*)     Immature Granulocytes 1 (*)     Segs Absolute 18.19 (*)     Absolute Lymph # 0.67 (*)     Absolute Mono # 1.45 (*)     All other components within normal limits   COMPREHENSIVE METABOLIC W/ BILI PROFILE W/ REFLEX TO MG - Abnormal; Notable for the following components:     Total Bilirubin 2.9 (*)     Bilirubin, Direct 1.0 (*)     Bilirubin, Indirect 1.9 (*)     Glucose 121 (*)     All other components within normal limits   AMYLASE - Abnormal; Notable for the following components:    Amylase 24 (*)     All other components within normal limits   LIPASE - Abnormal; Notable for the following components:    Lipase 12 (*)     All other components within normal limits   COVID-19, RAPID   CULTURE, BLOOD 1   CULTURE, BLOOD 1   LACTIC ACID   TROPONIN   LACTATE, SEPSIS           EMERGENCY DEPARTMENT COURSE:   Vitals:    Vitals:    10/28/22 1320 10/28/22 1400 10/28/22 1430 10/28/22 1438   BP: 123/74 122/72 107/66    Pulse: (!) 110   (!) 103   Resp: 20   18   Temp: (!) 101.1 °F (38.4 °C)   100.4 °F (38 °C)   TempSrc: Tympanic   Tympanic   SpO2: 94% 94% 94% 95%   Weight:       Height:         -------------------------  BP: 107/66, Temp: 100.4 °F (38 °C), Heart Rate: (!) 103, Resp: 18        Re-evaluation Notes  Patient is resting more comfortably to pain meds IVs infusing antibiotic is also infusing  CRITICAL CARE:   None        CONSULTS: Discussed case with general surgeon Dr. Shane Obrien he states that the patient has complex comorbidities and would like him cleared by the hospitalist Dr. Magaly Gan before he will operate at this facility I discussed the case with Dr. Gregory Vale and he said given his comorbidities the patient would be better served at a tertiary care center  Family requested I transfer to [facility] because been made  Discussed with the surgeon at [facility] Dr. Concepcion Cid who accepts the patient in transfer through the ER the ER Dr. Dipika Fitzgerald and direct transfer  PROCEDURES:  None    FINAL IMPRESSION      1. Cholecystitis          DISPOSITION/PLAN   DISPOSITION Decision To Transfer    Condition on Disposition    Stable    PATIENT REFERRED TO:  No follow-up provider specified. DISCHARGE MEDICATIONS:  New Prescriptions    No medications on file       (Please note that portions of this note were completed with a voice recognition program.  Efforts were made to edit the dictations but occasionally words are mis-transcribed.)    Terese Frankel, MD,, MD, F.A.A.E.M.   Attending Emergency Physician                           Terese Frankel, MD  10/28/22 4199

## 2022-10-29 LAB
EKG ATRIAL RATE: 101 BPM
EKG P AXIS: 46 DEGREES
EKG P-R INTERVAL: 132 MS
EKG Q-T INTERVAL: 352 MS
EKG QRS DURATION: 92 MS
EKG QTC CALCULATION (BAZETT): 456 MS
EKG R AXIS: -25 DEGREES
EKG T AXIS: 30 DEGREES
EKG VENTRICULAR RATE: 101 BPM

## 2022-11-02 ENCOUNTER — TELEPHONE (OUTPATIENT)
Dept: OTOLARYNGOLOGY | Age: 72
End: 2022-11-02

## 2022-11-02 LAB
CULTURE: NORMAL
CULTURE: NORMAL
SPECIMEN DESCRIPTION: NORMAL
SPECIMEN DESCRIPTION: NORMAL

## 2022-11-02 NOTE — TELEPHONE ENCOUNTER
[facility] from [employer] called stating patient had to cancel his Audio with them as he was in the hospital.  He is to call back to reschedule. His follow up appointment is 11/17/2022 with Dr Lisha Flannery.

## 2023-03-02 ENCOUNTER — TELEPHONE (OUTPATIENT)
Dept: PULMONOLOGY | Age: 73
End: 2023-03-02

## 2023-03-03 ENCOUNTER — TELEPHONE (OUTPATIENT)
Dept: SURGERY | Age: 73
End: 2023-03-03

## 2023-03-03 NOTE — TELEPHONE ENCOUNTER
Contacted patients PCP Dr. Letitia Shaffer office regarding referral placed for incisional hernia post cholecystectomy. Spoke with Ronan Mendez and discussed when patient was initially seen on 10/3/22 patient was referred to pulmonologist to be cleared for surgery due to being high risk for surgery and there could be a chance patient may need prolonged ventilation and surgery would need to be done in [place] or . [facility]. Patient declined to schedule pulmonary appointment at that time stating he didn't think he would need to have surgery and then ultimately did have emergent [place] 10[place]/22 [place] [facility] [place] [facility] [place] [facility]. Appointment cancelled for Monday 3/6/23 @ [place] . Per Ronan Mendez patient was referred back to pulmonologist and was to be seen before seeing Dr. Emre Skinner again. Per telephone encounter placed yesterday 3/2/23 by [facility] pulmonology, patient was contacted and refused to schedule appointment again. Dr. Letitia Shaffer office aware and patient will be scheduled again once he see's pulmonologist. Left VM for patient to return call to office.